# Patient Record
Sex: FEMALE | Race: ASIAN | URBAN - METROPOLITAN AREA
[De-identification: names, ages, dates, MRNs, and addresses within clinical notes are randomized per-mention and may not be internally consistent; named-entity substitution may affect disease eponyms.]

---

## 2017-02-14 ENCOUNTER — MYC MEDICAL ADVICE (OUTPATIENT)
Dept: FAMILY MEDICINE | Facility: CLINIC | Age: 62
End: 2017-02-14

## 2017-02-14 DIAGNOSIS — E78.5 HYPERLIPIDEMIA LDL GOAL <130: ICD-10-CM

## 2017-02-14 DIAGNOSIS — I10 HYPERTENSION GOAL BP (BLOOD PRESSURE) < 150/90: ICD-10-CM

## 2017-02-14 NOTE — TELEPHONE ENCOUNTER
atorvastatin (LIPITOR) 10 MG tablet   Last Written Prescription Date: 2/11/2016  Last Fill Quantity: 90, # refills: 3  [place] [facility] [place] [facility] [place] [facility] prescribing provider: 2/11/2016       Lab Results   Component Value Date    CHOL 190 02/11/2016     Lab Results   Component Value Date    HDL 79 02/11/2016     Lab Results   Component Value Date    LDL 93 02/11/2016     Lab Results   Component Value Date    TRIG 89 02/11/2016     Lab Results   Component Value Date    CHOLHDLRATIO 2.7 12/02/2014     hydrochlorothiazide 12.5 MG TABS   Last Written Prescription Date: 2/11/2016  Last Fill Quantity: 90, # refills: 3    [place] [facility] [place] [facility] [place] [facility] prescribing provider:  2/11/2016   Future Office Visit:        BP Readings from Last 3 Encounters:   02/11/16 139/76   12/10/14 114/70   06/03/14 (!) 150/98

## 2017-02-15 RX ORDER — ATORVASTATIN CALCIUM 10 MG/1
5 TABLET, FILM COATED ORAL DAILY
Qty: 15 TABLET | Refills: 0 | Status: SHIPPED | OUTPATIENT
Start: 2017-02-15

## 2017-02-15 RX ORDER — HYDROCHLOROTHIAZIDE 12.5 MG/1
12.5 TABLET ORAL DAILY
Qty: 30 TABLET | Refills: 0 | Status: SHIPPED | OUTPATIENT
Start: 2017-02-15

## 2017-02-15 NOTE — TELEPHONE ENCOUNTER
Routing refill request to provider for review/approval because:  Patient needs to be seen because it has been more than 1 year since last office visit.  Patient moved to [place]. Please review Mychart below and advise on the refills.    Alex TEJADA RN, BSN

## 2017-03-13 ENCOUNTER — TELEPHONE (OUTPATIENT)
Dept: FAMILY MEDICINE | Facility: CLINIC | Age: 62
End: 2017-03-13

## 2017-03-13 DIAGNOSIS — E78.5 HYPERLIPIDEMIA LDL GOAL <130: ICD-10-CM

## 2017-03-13 NOTE — TELEPHONE ENCOUNTER
atorvastatin (LIPITOR) 10 MG tablet     Last Written Prescription Date: 2/15/17  Last Fill Quantity: 15, # refills: 0  Last Office Visit with [facility] or [facility] prescribing provider: 2/11/16       Lab Results   Component Value Date    CHOL 190 02/11/2016     Lab Results   Component Value Date    HDL 79 02/11/2016     Lab Results   Component Value Date    LDL 93 02/11/2016     Lab Results   Component Value Date    TRIG 89 02/11/2016     Lab Results   Component Value Date    CHOLHDLRATIO 2.7 12/02/2014

## 2017-03-15 NOTE — TELEPHONE ENCOUNTER
TC - please help patient scheduling appointment for a follow up with Dr. Romero per recommendation below and route back for the medication to be refilled for one time after patient schedule an appointment with PCP.    Alex TEJADA, RN, BSN

## 2017-03-15 NOTE — TELEPHONE ENCOUNTER
Pt. Has not made appointment since last lillie refill given.  Please advise.  Yumiko Petersen RN

## 2017-03-16 RX ORDER — ATORVASTATIN CALCIUM 10 MG/1
TABLET, FILM COATED ORAL
Qty: 15 TABLET | Refills: 0 | OUTPATIENT
Start: 2017-03-16

## 2017-03-16 NOTE — TELEPHONE ENCOUNTER
Patient has moved to [place]. Elo Horan,     She said disregard medication request. Patient is finding new provider in [place]

## 2018-06-04 ENCOUNTER — HEALTH MAINTENANCE LETTER (OUTPATIENT)
Age: 63
End: 2018-06-04

## 2020-02-24 ENCOUNTER — HEALTH MAINTENANCE LETTER (OUTPATIENT)
Age: 65
End: 2020-02-24

## 2021-04-17 ENCOUNTER — HEALTH MAINTENANCE LETTER (OUTPATIENT)
Age: 66
End: 2021-04-17

## 2022-03-13 ENCOUNTER — HEALTH MAINTENANCE LETTER (OUTPATIENT)
Age: 67
End: 2022-03-13

## 2022-05-08 ENCOUNTER — HEALTH MAINTENANCE LETTER (OUTPATIENT)
Age: 67
End: 2022-05-08

## 2023-06-02 ENCOUNTER — HEALTH MAINTENANCE LETTER (OUTPATIENT)
Age: 68
End: 2023-06-02